# Patient Record
Sex: MALE | Race: WHITE | NOT HISPANIC OR LATINO | Employment: FULL TIME | URBAN - NONMETROPOLITAN AREA
[De-identification: names, ages, dates, MRNs, and addresses within clinical notes are randomized per-mention and may not be internally consistent; named-entity substitution may affect disease eponyms.]

---

## 2023-05-16 ENCOUNTER — APPOINTMENT (OUTPATIENT)
Dept: PRIMARY CARE | Facility: CLINIC | Age: 28
End: 2023-05-16
Payer: COMMERCIAL

## 2023-05-30 ENCOUNTER — OFFICE VISIT (OUTPATIENT)
Dept: PRIMARY CARE | Facility: CLINIC | Age: 28
End: 2023-05-30
Payer: COMMERCIAL

## 2023-05-30 VITALS
DIASTOLIC BLOOD PRESSURE: 64 MMHG | SYSTOLIC BLOOD PRESSURE: 101 MMHG | HEART RATE: 74 BPM | OXYGEN SATURATION: 97 % | WEIGHT: 153.1 LBS | BODY MASS INDEX: 22.61 KG/M2 | RESPIRATION RATE: 18 BRPM | TEMPERATURE: 98.1 F

## 2023-05-30 DIAGNOSIS — Z00.00 HEALTHCARE MAINTENANCE: Primary | ICD-10-CM

## 2023-05-30 DIAGNOSIS — E78.5 HYPERLIPIDEMIA, UNSPECIFIED HYPERLIPIDEMIA TYPE: ICD-10-CM

## 2023-05-30 PROBLEM — L21.9 DERMATITIS, SEBORRHEIC: Status: ACTIVE | Noted: 2023-05-30

## 2023-05-30 PROBLEM — R14.0 ABDOMINAL BLOATING: Status: ACTIVE | Noted: 2023-05-30

## 2023-05-30 PROBLEM — L70.9 ACNE: Status: ACTIVE | Noted: 2023-05-30

## 2023-05-30 PROCEDURE — 90715 TDAP VACCINE 7 YRS/> IM: CPT | Performed by: FAMILY MEDICINE

## 2023-05-30 PROCEDURE — 1036F TOBACCO NON-USER: CPT | Performed by: FAMILY MEDICINE

## 2023-05-30 PROCEDURE — 90471 IMMUNIZATION ADMIN: CPT | Performed by: FAMILY MEDICINE

## 2023-05-30 PROCEDURE — 99395 PREV VISIT EST AGE 18-39: CPT | Performed by: FAMILY MEDICINE

## 2023-05-30 PROCEDURE — 3008F BODY MASS INDEX DOCD: CPT | Performed by: FAMILY MEDICINE

## 2023-05-30 RX ORDER — LORATADINE 10 MG/1
10 TABLET ORAL DAILY
COMMUNITY

## 2023-05-30 RX ORDER — CEPHALEXIN 500 MG/1
CAPSULE ORAL
COMMUNITY
Start: 2023-01-17 | End: 2023-05-30 | Stop reason: WASHOUT

## 2023-05-30 ASSESSMENT — PATIENT HEALTH QUESTIONNAIRE - PHQ9
SUM OF ALL RESPONSES TO PHQ9 QUESTIONS 1 AND 2: 0
1. LITTLE INTEREST OR PLEASURE IN DOING THINGS: NOT AT ALL
2. FEELING DOWN, DEPRESSED OR HOPELESS: NOT AT ALL

## 2023-05-30 ASSESSMENT — PAIN SCALES - GENERAL: PAINLEVEL: 0-NO PAIN

## 2023-05-30 NOTE — ASSESSMENT & PLAN NOTE
1/2023 TC/HDL ratio 4.0, , HDL 41.6, , TRIG 81  Goal TC/HDL ratio 3.4 or less, LDL 99 or less,  or less, and TRIG 150 or less.   Diet and exercise recommendations revisited.

## 2023-05-30 NOTE — PATIENT INSTRUCTIONS
WELLNESS EXAM COMPLETED  TDAP: 2000 - due - booster administered today, good for 10 years  GARDASIL: none found  CSCOPE: N/A  PSA: N/A  HEP C SCREEN: none found    SCREENINGS  -For those at average risk, American Cancer Society recommends Colon Cancer Screening starting at the age of 45 through age 75. Repeat depends on results/type.      -For those at average risk, American Cancer Society recommends Prostate Cancer Screening (PSA blood test) starting at age 50-55 through age 70.     LIFESTYLE MEASURES  -make sure you are avoiding refined carbs such as breads, pasta, cereal, candy, soda,  nutrition bars, granola, chips, and sugar sweetened beverages.      -eat 5- 7 servings daily of veggies,  healthy protein such as chicken, fish,  beans, and eggs, and include healthy fats in your diet such as seeds, nuts, olive oil, avocados, and salmon.   -exercise 4 - 6 days per week as you are able, 150 minutes total weekly divided up is recommended.  -Vitamin D is recommended at 1000 - 5000 IU international units daily.   -Always wear sunscreen when you have sun exposure.  -64 oz of water is recommended daily.  -Dental visits recommended every 6 months.  -Eye exam recommended every 2 years, for those with vision problems every year.

## 2023-05-30 NOTE — ASSESSMENT & PLAN NOTE
Vaccines and screenings reviewed.  Questionnaires completed.  Health and wellness topics reviewed.  Diet and exercise recommendations revisited.  Routine blood work reviewed today, this was done in 1/2023.  Tetanus booster administered today.

## 2023-05-30 NOTE — PROGRESS NOTES
Subjective   Patient ID: Rancho Mueller is a 27 y.o. male who presents for Annual Exam.    Well Adult Physical   Patient here for a comprehensive physical exam.       TDAP: 2000 - due  GARDASIL: none found  CSCOPE: N/A  PSA: N/A  HEP C SCREEN: none found    Diet/Exercise: M/W plays volleyball, lots of soccer when in [place].  Reports some GI issues when in [place], since resolved, attributes this to change in style of food. Eating less processed foods since he was here last.    Dental visits up to date, within last 6 months.  Vision screening up to date, within last 6 months, wears corrective lenses.    Patient is agreeable to TDAP vaccine.  Patient is agreeable to/declines * vaccine.     Prostate cancer screening: Denies family history.   Denies hesitancy, nocturia, or urgency.     Colon cancer screening: Denies family history.   Denies melena, hematochezia, constipation, diarrhea, bloating, change in bowel habits.    Patient denies any FMHx of cardiac disease.  Denies chest pain, SOB, palpitations, edema, dizziness.     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  He comes back periodically when he runs out of money.  Plans to do some mission work locally.    Takes Claritin when home because of allergies, not as bed when in [place].    Minor R ring finger pain, middle phalange   Stubbed finger last night, now having mild pain.    Review of Systems   All other systems reviewed and are negative.      Objective   /64 (BP Location: Right arm, Patient Position: Sitting, BP Cuff Size: Adult)   Pulse 74   Temp 36.7 °C (98.1 °F)   Resp 18   Wt 69.4 kg (153 lb 1.6 oz)   SpO2 97%   BMI 22.61 kg/m²     Physical Exam  Vitals and nursing note reviewed.   Constitutional:       General: He is not in acute distress.     Appearance: Normal appearance. He is not  toxic-appearing.   HENT:      Head: Normocephalic and atraumatic.   Eyes:      Extraocular Movements: Extraocular movements intact.      Pupils: Pupils are equal, round, and reactive to light.   Neck:      Thyroid: No thyromegaly.      Vascular: No hepatojugular reflux or JVD.   Cardiovascular:      Rate and Rhythm: Normal rate and regular rhythm.      Heart sounds: No murmur heard.     No friction rub. No gallop.   Pulmonary:      Effort: Pulmonary effort is normal.      Breath sounds: Normal breath sounds. No wheezing, rhonchi or rales.   Abdominal:      General: Bowel sounds are normal. There is no distension.      Palpations: Abdomen is soft. There is no mass.      Tenderness: There is no abdominal tenderness. There is no guarding.   Musculoskeletal:         General: Normal range of motion.      Right lower leg: No edema.      Left lower leg: No edema.   Lymphadenopathy:      Cervical: No cervical adenopathy.   Skin:     General: Skin is warm and dry.      Comments: 4 mm pink papule medial shin right leg   Neurological:      General: No focal deficit present.      Mental Status: He is alert and oriented to person, place, and time.      Gait: Gait normal.   Psychiatric:         Mood and Affect: Mood normal.         Behavior: Behavior normal.         Assessment/Plan   Problem List Items Addressed This Visit          Other    Healthcare maintenance - Primary     Vaccines and screenings reviewed.  Questionnaires completed.  Health and wellness topics reviewed.  Diet and exercise recommendations revisited.  Routine blood work reviewed today, this was done in 1/2023.  Tetanus booster administered today.         Relevant Orders    Tdap vaccine, age 10 years and older  (BOOSTRIX)    Body mass index (BMI) of 22.0 to 22.9 in adult    Hyperlipidemia     1/2023 TC/HDL ratio 4.0, , HDL 41.6, , TRIG 81  Goal TC/HDL ratio 3.4 or less, LDL 99 or less,  or less, and TRIG 150 or less.   Diet and exercise  recommendations revisited.           Follow-up in 1 year for annual physical.   Call for sooner follow-up if needed.          Scribe Attestation  By signing my name below, I, Rosalinda Ho   attest that this documentation has been prepared under the direction and in the presence of Cherelle Hemphill DO.

## 2024-05-21 NOTE — PROGRESS NOTES
Subjective        Rancho Mueller is a 28 y.o. male who presents for      HPI:      Dr Hemphill pt     Chief Complaint   Patient presents with    Groin Pain     Right side x 3 days No known injury  Pt describes pain as feeling like a strained muscle, sensitive to the touch, localized to  proximal-lateral thigh muscle  Pt denies pain at rest and at night-pain does not prevent Pt from any activities  Stretching has not alleviates sxs, certain movements exacerbate pain.             Played volleyball Friday, plays disc golf- regular activities       C/o pain in right groin area- at inguinal crease         Social History     Tobacco Use   Smoking Status Never    Passive exposure: Never   Smokeless Tobacco Never         Review of Systems:        Objective        Vitals:    05/22/24 1359   BP: 106/62   BP Location: Left arm   Patient Position: Sitting   BP Cuff Size: Adult   Pulse: 77   Resp: 16   Temp: 37.2 °C (99 °F)   SpO2: 97%   Weight: 72.7 kg (160 lb 4.8 oz)       Patient is alert and oriented x 3 , NAD  SKIN- right inguinal crease- 1 cm area- tender and swollen  at base hair follicle - mild redness , not warm, tender, no drainage               BP Readings from Last 3 Encounters:   05/22/24 106/62   05/30/23 101/64   01/17/23 105/67           Wt Readings from Last 3 Encounters:   05/22/24 72.7 kg (160 lb 4.8 oz)   05/30/23 69.4 kg (153 lb 1.6 oz)   01/17/23 72.2 kg (159 lb 1 oz)         BMI Readings from Last 3 Encounters:   05/22/24 23.67 kg/m²   05/30/23 22.61 kg/m²   01/17/23 23.49 kg/m²         Assessment/Plan      1. Ingrown hair  amoxicillin-pot clavulanate (Augmentin) 875-125 mg tablet      2. Hair follicle infection  amoxicillin-pot clavulanate (Augmentin) 875-125 mg tablet                       Go to ED-if symptoms worsen or if no better- if increased redness, pain, or swelling; area appears more swollen or draining pus; fever; increased feelings of malaise or fatigue

## 2024-05-22 ENCOUNTER — OFFICE VISIT (OUTPATIENT)
Dept: PRIMARY CARE | Facility: CLINIC | Age: 29
End: 2024-05-22
Payer: COMMERCIAL

## 2024-05-22 VITALS
OXYGEN SATURATION: 97 % | DIASTOLIC BLOOD PRESSURE: 62 MMHG | HEART RATE: 77 BPM | RESPIRATION RATE: 16 BRPM | SYSTOLIC BLOOD PRESSURE: 106 MMHG | TEMPERATURE: 99 F | BODY MASS INDEX: 23.67 KG/M2 | WEIGHT: 160.3 LBS

## 2024-05-22 DIAGNOSIS — L73.9 HAIR FOLLICLE INFECTION: ICD-10-CM

## 2024-05-22 DIAGNOSIS — L73.1 INGROWN HAIR: Primary | ICD-10-CM

## 2024-05-22 PROCEDURE — 1036F TOBACCO NON-USER: CPT | Performed by: FAMILY MEDICINE

## 2024-05-22 PROCEDURE — 99214 OFFICE O/P EST MOD 30 MIN: CPT | Performed by: FAMILY MEDICINE

## 2024-05-22 RX ORDER — AMOXICILLIN AND CLAVULANATE POTASSIUM 875; 125 MG/1; MG/1
TABLET, FILM COATED ORAL
Qty: 20 TABLET | Refills: 0 | Status: SHIPPED | OUTPATIENT
Start: 2024-05-22

## 2024-05-30 ENCOUNTER — APPOINTMENT (OUTPATIENT)
Dept: PRIMARY CARE | Facility: CLINIC | Age: 29
End: 2024-05-30
Payer: COMMERCIAL

## 2024-05-31 ENCOUNTER — OFFICE VISIT (OUTPATIENT)
Dept: PRIMARY CARE | Facility: CLINIC | Age: 29
End: 2024-05-31
Payer: COMMERCIAL

## 2024-05-31 VITALS
BODY MASS INDEX: 23.7 KG/M2 | OXYGEN SATURATION: 95 % | HEIGHT: 68 IN | DIASTOLIC BLOOD PRESSURE: 59 MMHG | WEIGHT: 156.4 LBS | HEART RATE: 67 BPM | SYSTOLIC BLOOD PRESSURE: 92 MMHG | TEMPERATURE: 97.9 F

## 2024-05-31 DIAGNOSIS — Z00.00 PHYSICAL EXAM, ANNUAL: Primary | ICD-10-CM

## 2024-05-31 DIAGNOSIS — Z00.00 HEALTHCARE MAINTENANCE: ICD-10-CM

## 2024-05-31 PROCEDURE — 1036F TOBACCO NON-USER: CPT | Performed by: FAMILY MEDICINE

## 2024-05-31 PROCEDURE — 99395 PREV VISIT EST AGE 18-39: CPT | Performed by: FAMILY MEDICINE

## 2024-05-31 ASSESSMENT — ENCOUNTER SYMPTOMS
LIGHT-HEADEDNESS: 0
DYSURIA: 0
SHORTNESS OF BREATH: 0
ADENOPATHY: 0
PALPITATIONS: 0
HEMATURIA: 0
UNEXPECTED WEIGHT CHANGE: 0
HEADACHES: 0
COUGH: 0
BRUISES/BLEEDS EASILY: 0
FEVER: 0

## 2024-05-31 NOTE — PROGRESS NOTES
" Subjective   Patient ID: Rancho Mueller is a 28 y.o. male who presents for Annual Exam (Pt is here for his annual exam. Pt has no new issues to discuss. ).  HPI    Pt here for wellness .  Last CPE 1 year ago .       Interval Health :  seen here for infected hair follicle. Much better  . Finishing antibx .     Interval Changes in PMHx. PSHx, FMHx : none     Concerns/Questions: none         Review of Systems   Constitutional:  Negative for fever and unexpected weight change.   HENT:  Negative for dental problem.    Eyes:  Negative for visual disturbance.   Respiratory:  Negative for cough and shortness of breath.    Cardiovascular:  Negative for chest pain and palpitations.   Genitourinary:  Negative for dysuria and hematuria.   Skin:  Negative for rash.   Neurological:  Negative for syncope, light-headedness and headaches.   Hematological:  Negative for adenopathy. Does not bruise/bleed easily.     PSHx, FMHx, Problem / Med/ Allergy reviewed.     Soc   Works as a teacher, at [employer] in [place].    Exercises regularly    Objective   BP 92/59 (BP Location: Right arm, Patient Position: Sitting, BP Cuff Size: Adult)   Pulse 67   Temp 36.6 °C (97.9 °F) (Temporal)   Ht 1.734 m (5' 8.25\")   Wt 70.9 kg (156 lb 6.4 oz)   SpO2 95%   BMI 23.61 kg/m²     Physical Exam  Vitals and nursing note reviewed.   Constitutional:       General: He is not in acute distress.     Appearance: Normal appearance.   Cardiovascular:      Heart sounds: Normal heart sounds.   Pulmonary:      Breath sounds: Normal breath sounds.   Abdominal:      General: Bowel sounds are normal.      Palpations: Abdomen is soft.   Musculoskeletal:         General: Normal range of motion.      Cervical back: Neck supple.   Neurological:      General: No focal deficit present.      Mental Status: He is alert and oriented to person, place, and time.     No skin lesion, right groin. No redness. No mass.         Assessment/Plan   Problem List Items " Addressed This Visit          Medium    Healthcare maintenance     Other Visit Diagnoses       Physical exam, annual    -  Primary    Relevant Orders    Follow Up In [facility] - PCP          Very healthy.  Recent infx is healing . Labwork - done last year, normal .     Does not need to repeat now.         Followup : 1 year, PCP     FRED Boyer MD

## 2024-06-10 ENCOUNTER — TELEMEDICINE (OUTPATIENT)
Dept: PRIMARY CARE | Facility: CLINIC | Age: 29
End: 2024-06-10
Payer: COMMERCIAL

## 2024-06-10 DIAGNOSIS — K52.1 ANTIBIOTIC-ASSOCIATED DIARRHEA: Primary | ICD-10-CM

## 2024-06-10 DIAGNOSIS — T36.95XA ANTIBIOTIC-ASSOCIATED DIARRHEA: Primary | ICD-10-CM

## 2024-06-10 PROCEDURE — 99213 OFFICE O/P EST LOW 20 MIN: CPT | Performed by: PHYSICIAN ASSISTANT

## 2024-06-10 NOTE — PROGRESS NOTES
An interactive audio and video telecommunication system which permits real time communications between the patient (at the originating site) and provider (at the distant site) was utilized to provide this telehealth service.   Verbal consent was requested and obtained from Rancho Mueller on this date, 06/10/24 for a telehealth visit.     Subjective    Rancho Mueller is a 28 y.o. year old male patient presenting for virtual visit   Chief Complaint   Patient presents with    Diarrhea      Was on Augmentin late May, had GI bug symptoms late in the course of augmentin. 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   The following week, recurred with soft stools, intermittently to more watery stools, but not straight liquid. No foul roadkill- like odor. Only had a few days of very loose Bms.  Most days only a few Bms, but still loose or soft.  Feels like Bms have not gone back to normal and was worried    Patient Active Problem List   Diagnosis    Acne    Dermatitis, seborrheic    Healthcare maintenance    Body mass index (BMI) of 22.0 to 22.9 in adult    Hyperlipidemia       Past Medical History:   Diagnosis Date    Allergic 2009      Past Surgical History:   Procedure Laterality Date    HERNIA REPAIR  07/28/2016    Inguinal Hernia Repair ;  bilateral   1 as a child, other in adolescence    WISDOM TOOTH EXTRACTION        Family History   Problem Relation Name Age of Onset    No Known Problems Mother      No Known Problems Father        Social History     Tobacco Use    Smoking status: Never     Passive exposure: Never    Smokeless tobacco: Never   Substance Use Topics    Alcohol use: Not Currently        Current Outpatient Medications:     amoxicillin-pot clavulanate (Augmentin) 875-125 mg tablet, Tale one tablet twice a day for 10 days, Disp: 20 tablet, Rfl: 0    loratadine (Claritin) 10 mg tablet, Take 1 tablet (10 mg) by mouth once daily., Disp: , Rfl:      Review of Systems    Review of Systems:   Constitutional:  Denies fever  HEENT: Denies ST, earache  CVS: Denies Chest pain  Pulmonary: Denies wheezing, SOB  GI: Denies N/V  : Denies dysuria  Musculoskeletal:  Denies myalgia  Neuro: Denies focal weakness or numbness.  Skin: Denies Rashes.  *Review of Systems is negative unless otherwise mentioned in HPI or ROS above.     Objective    VITALS  Pt does not have vitals available at time of visit.    Exam       Limited physical exam in virtual platform  Nontoxic. No acute distress.  Nonlabored breathing.    Encourged probiotics and balanced diet to help microbiome recover from antibiotic-associated diarrhea and then viral gastroenteritis.    Assessment/Plan      Problem List Items Addressed This Visit    None  Visit Diagnoses       Antibiotic-associated diarrhea    -  Primary